# Patient Record
Sex: FEMALE | Race: WHITE | NOT HISPANIC OR LATINO | URBAN - METROPOLITAN AREA
[De-identification: names, ages, dates, MRNs, and addresses within clinical notes are randomized per-mention and may not be internally consistent; named-entity substitution may affect disease eponyms.]

---

## 2017-05-23 ENCOUNTER — OFFICE VISIT (OUTPATIENT)
Dept: OBSTETRICS AND GYNECOLOGY | Age: 67
End: 2017-05-23

## 2017-05-23 VITALS
SYSTOLIC BLOOD PRESSURE: 138 MMHG | HEIGHT: 63 IN | WEIGHT: 185.8 LBS | DIASTOLIC BLOOD PRESSURE: 64 MMHG | BODY MASS INDEX: 32.92 KG/M2

## 2017-05-23 DIAGNOSIS — E03.4 HYPOTHYROIDISM DUE TO ACQUIRED ATROPHY OF THYROID: ICD-10-CM

## 2017-05-23 DIAGNOSIS — K21.9 GASTROESOPHAGEAL REFLUX DISEASE, ESOPHAGITIS PRESENCE NOT SPECIFIED: ICD-10-CM

## 2017-05-23 DIAGNOSIS — Z00.00 ANNUAL PHYSICAL EXAM: Primary | ICD-10-CM

## 2017-05-23 PROCEDURE — 99397 PER PM REEVAL EST PAT 65+ YR: CPT | Performed by: OBSTETRICS & GYNECOLOGY

## 2017-05-23 RX ORDER — MONTELUKAST SODIUM 10 MG/1
10 TABLET ORAL
COMMUNITY

## 2017-05-23 RX ORDER — CLONAZEPAM 0.5 MG/1
0.5 TABLET ORAL
COMMUNITY
Start: 2017-05-12

## 2017-05-23 RX ORDER — LEVALBUTEROL TARTRATE 45 UG/1
1-2 AEROSOL, METERED ORAL
COMMUNITY

## 2017-05-23 RX ORDER — PANTOPRAZOLE SODIUM 40 MG/1
TABLET, DELAYED RELEASE ORAL
COMMUNITY
Start: 2017-01-29

## 2017-05-23 RX ORDER — CYANOCOBALAMIN (VITAMIN B-12) 1000 MCG
1 TABLET, EXTENDED RELEASE ORAL
COMMUNITY

## 2017-05-23 RX ORDER — ONDANSETRON 4 MG/1
4 TABLET, FILM COATED ORAL
COMMUNITY
Start: 2016-10-07 | End: 2021-06-28

## 2017-05-23 RX ORDER — LEVALBUTEROL INHALATION SOLUTION 0.31 MG/3ML
0.31 SOLUTION RESPIRATORY (INHALATION)
COMMUNITY
Start: 2015-03-04 | End: 2021-06-28 | Stop reason: SDUPTHER

## 2017-05-23 RX ORDER — DULOXETIN HYDROCHLORIDE 60 MG/1
60 CAPSULE, DELAYED RELEASE ORAL
COMMUNITY
Start: 2016-10-20 | End: 2021-06-28

## 2017-05-23 RX ORDER — FUROSEMIDE 40 MG/1
40 TABLET ORAL
COMMUNITY
Start: 2017-01-09 | End: 2018-01-09

## 2017-05-23 RX ORDER — VERAPAMIL HYDROCHLORIDE 360 MG/1
360 CAPSULE, DELAYED RELEASE PELLETS ORAL
COMMUNITY
Start: 2016-07-12 | End: 2021-06-28 | Stop reason: SDUPTHER

## 2017-05-23 RX ORDER — SUMATRIPTAN 100 MG/1
100 TABLET, FILM COATED ORAL
COMMUNITY
Start: 2016-05-17

## 2017-05-23 RX ORDER — PROMETHAZINE HYDROCHLORIDE AND CODEINE PHOSPHATE 6.25; 1 MG/5ML; MG/5ML
5 SYRUP ORAL
COMMUNITY
Start: 2017-05-12

## 2017-05-23 RX ORDER — LEVOTHYROXINE SODIUM 0.07 MG/1
100 TABLET ORAL
COMMUNITY
Start: 2017-04-21

## 2017-05-23 RX ORDER — FLUOCINONIDE 0.5 MG/G
OINTMENT TOPICAL
COMMUNITY
Start: 2014-12-29 | End: 2021-06-28

## 2017-05-23 RX ORDER — TRAMADOL HYDROCHLORIDE 50 MG/1
50 TABLET ORAL
COMMUNITY
Start: 2017-05-12 | End: 2021-06-28

## 2017-05-23 RX ORDER — BUDESONIDE AND FORMOTEROL FUMARATE DIHYDRATE 160; 4.5 UG/1; UG/1
2 AEROSOL RESPIRATORY (INHALATION)
COMMUNITY

## 2017-05-23 RX ORDER — CETIRIZINE HYDROCHLORIDE 10 MG/1
10 TABLET ORAL
COMMUNITY

## 2018-10-31 ENCOUNTER — TRANSCRIBE ORDERS (OUTPATIENT)
Dept: ADMINISTRATIVE | Facility: HOSPITAL | Age: 68
End: 2018-10-31

## 2018-10-31 DIAGNOSIS — Z12.31 VISIT FOR SCREENING MAMMOGRAM: Primary | ICD-10-CM

## 2019-01-15 ENCOUNTER — PROCEDURE VISIT (OUTPATIENT)
Dept: OBSTETRICS AND GYNECOLOGY | Age: 69
End: 2019-01-15

## 2019-01-15 ENCOUNTER — APPOINTMENT (OUTPATIENT)
Dept: WOMENS IMAGING | Facility: HOSPITAL | Age: 69
End: 2019-01-15

## 2019-01-15 DIAGNOSIS — Z12.31 VISIT FOR SCREENING MAMMOGRAM: Primary | ICD-10-CM

## 2019-01-15 PROCEDURE — 77067 SCR MAMMO BI INCL CAD: CPT | Performed by: OBSTETRICS & GYNECOLOGY

## 2019-01-15 PROCEDURE — 77067 SCR MAMMO BI INCL CAD: CPT | Performed by: RADIOLOGY

## 2019-02-27 ENCOUNTER — OFFICE VISIT (OUTPATIENT)
Dept: OBSTETRICS AND GYNECOLOGY | Age: 69
End: 2019-02-27

## 2019-02-27 VITALS
HEIGHT: 63 IN | SYSTOLIC BLOOD PRESSURE: 120 MMHG | BODY MASS INDEX: 32.07 KG/M2 | DIASTOLIC BLOOD PRESSURE: 78 MMHG | WEIGHT: 181 LBS

## 2019-02-27 DIAGNOSIS — Z00.00 ANNUAL PHYSICAL EXAM: Primary | ICD-10-CM

## 2019-02-27 DIAGNOSIS — J44.9 COPD (CHRONIC OBSTRUCTIVE PULMONARY DISEASE) WITH CHRONIC BRONCHITIS (HCC): ICD-10-CM

## 2019-02-27 DIAGNOSIS — M85.852 OSTEOPENIA OF NECK OF LEFT FEMUR: ICD-10-CM

## 2019-02-27 PROBLEM — J44.89 COPD (CHRONIC OBSTRUCTIVE PULMONARY DISEASE) WITH CHRONIC BRONCHITIS: Status: ACTIVE | Noted: 2019-02-27

## 2019-02-27 PROBLEM — G43.719 INTRACTABLE CHRONIC MIGRAINE WITHOUT AURA AND WITHOUT STATUS MIGRAINOSUS: Status: ACTIVE | Noted: 2019-02-27

## 2019-02-27 PROCEDURE — 99397 PER PM REEVAL EST PAT 65+ YR: CPT | Performed by: OBSTETRICS & GYNECOLOGY

## 2019-02-27 RX ORDER — LANOLIN ALCOHOL/MO/W.PET/CERES
CREAM (GRAM) TOPICAL
COMMUNITY

## 2019-02-27 RX ORDER — TRIAMCINOLONE ACETONIDE 55 UG/1
SPRAY, METERED NASAL
COMMUNITY
End: 2021-06-28

## 2019-02-27 RX ORDER — INDOMETHACIN 50 MG/1
CAPSULE ORAL
COMMUNITY
Start: 2018-12-31 | End: 2021-06-28

## 2019-02-27 RX ORDER — TOPIRAMATE 25 MG/1
25 TABLET ORAL
COMMUNITY
Start: 2018-06-15

## 2019-02-27 RX ORDER — PREDNISONE 20 MG/1
20 TABLET ORAL DAILY
COMMUNITY
Start: 2019-02-15 | End: 2021-06-28

## 2019-02-27 NOTE — PROGRESS NOTES
Subjective   Lilliana Robles is a 68 y.o. female is being seen today for   Chief Complaint   Patient presents with   • Gynecologic Exam     AE ([facility]). HYST no HRT.  MG 01/15/2019.  C-scope  = negative.  Family hx of colon, breast and ovarian cancer.  Lilliana has been sick since Dec 2018 w/ bronchitis.    .    History of Present Illness  Patient is here for annual check.  Right now she is having some lung problems for last couple months bronchitis not going away she is taking steroids antibiotics but still not much better.  This is all part of a chronic problem with an acute flareup.  So hopefully she will be able to get over this.  He has daughter will be getting  in a year down in [place] and wants to go down and help her little bit.  The other daughter is trying to adopt a child had on all plan but it fell through.  The only other family history is a nephew with ocular melanoma.  Her bowels and bladder work well.  The following portions of the patient's history were reviewed and updated as appropriate: allergies, current medications, past family history, past medical history, past social history, past surgical history and problem list.    Vitals:    19 1138   BP: 120/78       PAST MEDICAL HISTORY  History reviewed. No pertinent past medical history.  OB History      Para Term  AB Living    2 2 2     2    SAB TAB Ectopic Molar Multiple Live Births              2        Past Surgical History:   Procedure Laterality Date   • BREAST BIOPSY     • HYSTERECTOMY       Family History   Problem Relation Age of Onset   • No Known Problems Mother    • Lung cancer Father    • No Known Problems Sister    • Colon cancer Brother    • No Known Problems Daughter    • No Known Problems Maternal Grandmother    • No Known Problems Paternal Grandmother    • Cancer Maternal Aunt    • Ovarian cancer Paternal Aunt    • Colon cancer Paternal Uncle    • Colon cancer Paternal Uncle    • Colon cancer  Paternal Uncle    • No Known Problems Daughter    • BRCA 1/2 Neg Hx    • Breast cancer Neg Hx    • Endometrial cancer Neg Hx      Social History     Tobacco Use   Smoking Status Never Smoker   Smokeless Tobacco Never Used       Current Outpatient Medications:   •  Biotin 300 MCG tablet, Take  by mouth., Disp: , Rfl:   •  budesonide-formoterol (SYMBICORT) 160-4.5 MCG/ACT inhaler, Inhale 2 puffs., Disp: , Rfl:   •  calcium citrate-vitamin D (CITRACAL+D) 315-200 MG-UNIT per tablet, Take 1 tablet by mouth., Disp: , Rfl:   •  clonazePAM (KlonoPIN) 0.5 MG tablet, Take 0.5 mg by mouth., Disp: , Rfl:   •  Dexchlorpheniramine-Phenyleph 2-10 MG/5ML liquid, Take  by mouth., Disp: , Rfl:   •  DULoxetine (CYMBALTA) 60 MG capsule, Take 60 mg by mouth., Disp: , Rfl:   •  immune globulin, human, (FLEBOGAMMA, PRIVIGEN) 10 GM/100ML solution injection, Infuse  into a venous catheter., Disp: , Rfl:   •  immune globulin, human, 1 GM/20ML solution, Infuse  into a venous catheter., Disp: , Rfl:   •  indomethacin (INDOCIN) 50 MG capsule, TAKE ONE CAPSULE BY MOUTH TWICE A DAY AS NEEDED FOR HEADACHE, Disp: , Rfl:   •  levalbuterol (XOPENEX HFA) 45 MCG/ACT inhaler, Inhale 1-2 puffs., Disp: , Rfl:   •  levalbuterol (XOPENEX) 0.31 MG/3ML nebulizer solution, Inhale 0.31 mg., Disp: , Rfl:   •  levothyroxine (SYNTHROID, LEVOTHROID) 75 MCG tablet, TAKE 1 TABLET DAILY, Disp: , Rfl:   •  montelukast (SINGULAIR) 10 MG tablet, Take 10 mg by mouth., Disp: , Rfl:   •  ondansetron (ZOFRAN) 4 MG tablet, Take 4 mg by mouth., Disp: , Rfl:   •  predniSONE (DELTASONE) 20 MG tablet, Take 20 mg by mouth Daily., Disp: , Rfl:   •  tiotropium (SPIRIVA HANDIHALER) 18 MCG per inhalation capsule, Place 18 mcg into inhaler and inhale., Disp: , Rfl:   •  topiramate (TOPAMAX) 25 MG tablet, Take 25 mg by mouth., Disp: , Rfl:   •  traMADol (ULTRAM) 50 MG tablet, Take 50 mg by mouth., Disp: , Rfl:   •  Triamcinolone Acetonide (NASACORT) 55 MCG/ACT nasal inhaler, into the  nostril(s) as directed by provider., Disp: , Rfl:   •  verapamil PM (VERELAN PM) 360 MG 24 hr capsule, Take 360 mg by mouth., Disp: , Rfl:   •  cetirizine (zyrTEC) 10 MG tablet, Take 10 mg by mouth., Disp: , Rfl:   •  Cyanocobalamin (VITAMIN B-12 ER) 1000 MCG tablet controlled-release, Take 1 tablet by mouth., Disp: , Rfl:   •  fluocinonide (LIDEX) 0.05 % ointment, Apply  topically., Disp: , Rfl:   •  MAGNESIUM PO, Take  by mouth., Disp: , Rfl:   •  pantoprazole (PROTONIX) 40 MG EC tablet, TAKE 1 TABLET TWICE A DAY FOR A MAXIMUM OF 3 MONTHS THEN RESUME TO ONCE DAILY, Disp: , Rfl:   •  promethazine-codeine (PHENERGAN with CODEINE) 6.25-10 MG/5ML syrup, Take 5 mL by mouth., Disp: , Rfl:   •  SUMAtriptan (IMITREX) 100 MG tablet, Take 100 mg by mouth., Disp: , Rfl:     There is no immunization history on file for this patient.    Review of Systems   Constitutional: Negative for chills, fatigue, fever and unexpected weight change.   Respiratory: Positive for shortness of breath, wheezing and stridor.    Cardiovascular: Negative for chest pain.   Gastrointestinal: Negative for abdominal distention, abdominal pain, blood in stool, constipation, diarrhea and nausea.   Genitourinary: Negative for difficulty urinating, dyspareunia, dysuria, frequency, hematuria, menstrual problem, pelvic pain, urgency and vaginal discharge.   Skin: Negative for rash.       Objective   Physical Exam   Constitutional: She is oriented to person, place, and time. Vital signs are normal. She appears well-developed and well-nourished.   Neck: No thyromegaly present.   Cardiovascular: Normal rate, regular rhythm and normal heart sounds.   Pulmonary/Chest: Effort normal. Right breast exhibits no inverted nipple, no mass, no nipple discharge, no skin change and no tenderness. Left breast exhibits no inverted nipple, no mass, no nipple discharge, no skin change and no tenderness. Breasts are symmetrical. There is no breast swelling.   Abdominal: Soft.    Genitourinary: Vagina normal. No breast tenderness, discharge or bleeding. Pelvic exam was performed with patient supine. No labial fusion. There is no rash, tenderness, lesion or injury on the right labia. There is no rash, tenderness, lesion or injury on the left labia. Cervix exhibits no motion tenderness, no discharge and no friability. Right adnexum displays no mass, no tenderness and no fullness. Left adnexum displays no mass, no tenderness and no fullness.   Neurological: She is alert and oriented to person, place, and time.   Psychiatric: She has a normal mood and affect.   Vitals reviewed.        Assessment/Plan   Lilliana was seen today for gynecologic exam.    Diagnoses and all orders for this visit:    Annual physical exam    Osteopenia of neck of left femur    COPD (chronic obstructive pulmonary disease) with chronic bronchitis (CMS/[facility])      Normal exam post hysterectomy.  No Pap smear done today.  Mammogram month ago was normal.  We will repeat a bone density next year she has osteopenia and a colonoscopy is up-to-date.  She really cannot exercise because of her lung problems right now.  Come back in a year

## 2020-01-23 ENCOUNTER — OFFICE (OUTPATIENT)
Dept: URBAN - METROPOLITAN AREA CLINIC 75 | Facility: CLINIC | Age: 70
End: 2020-01-23

## 2020-01-23 VITALS
HEART RATE: 64 BPM | SYSTOLIC BLOOD PRESSURE: 114 MMHG | DIASTOLIC BLOOD PRESSURE: 74 MMHG | HEIGHT: 65 IN | WEIGHT: 210 LBS

## 2020-01-23 DIAGNOSIS — R11.0 NAUSEA: ICD-10-CM

## 2020-01-23 DIAGNOSIS — R13.10 DYSPHAGIA, UNSPECIFIED: ICD-10-CM

## 2020-01-23 DIAGNOSIS — R10.9 UNSPECIFIED ABDOMINAL PAIN: ICD-10-CM

## 2020-01-23 DIAGNOSIS — R19.4 CHANGE IN BOWEL HABIT: ICD-10-CM

## 2020-01-23 DIAGNOSIS — Z79.1 LONG TERM (CURRENT) USE OF NON-STEROIDAL ANTI-INFLAMMATORIES: ICD-10-CM

## 2020-01-23 DIAGNOSIS — Z80.9 FAMILY HISTORY OF MALIGNANT NEOPLASM, UNSPECIFIED: ICD-10-CM

## 2020-01-23 DIAGNOSIS — K62.5 HEMORRHAGE OF ANUS AND RECTUM: ICD-10-CM

## 2020-01-23 PROCEDURE — 99204 OFFICE O/P NEW MOD 45 MIN: CPT | Performed by: NURSE PRACTITIONER

## 2020-01-23 RX ORDER — HYDROCORTISONE 25 MG/G
CREAM TOPICAL
Qty: 1 | Refills: 1 | Status: COMPLETED
Start: 2020-01-23 | End: 2022-09-01

## 2020-02-17 ENCOUNTER — ON CAMPUS - OUTPATIENT (OUTPATIENT)
Dept: URBAN - METROPOLITAN AREA HOSPITAL 108 | Facility: HOSPITAL | Age: 70
End: 2020-02-17

## 2020-02-17 DIAGNOSIS — R19.7 DIARRHEA, UNSPECIFIED: ICD-10-CM

## 2020-02-17 DIAGNOSIS — D12.0 BENIGN NEOPLASM OF CECUM: ICD-10-CM

## 2020-02-17 DIAGNOSIS — K29.50 UNSPECIFIED CHRONIC GASTRITIS WITHOUT BLEEDING: ICD-10-CM

## 2020-02-17 DIAGNOSIS — R13.10 DYSPHAGIA, UNSPECIFIED: ICD-10-CM

## 2020-02-17 DIAGNOSIS — K31.89 OTHER DISEASES OF STOMACH AND DUODENUM: ICD-10-CM

## 2020-02-17 PROCEDURE — 45380 COLONOSCOPY AND BIOPSY: CPT | Performed by: INTERNAL MEDICINE

## 2020-02-17 PROCEDURE — 43239 EGD BIOPSY SINGLE/MULTIPLE: CPT | Performed by: INTERNAL MEDICINE

## 2020-02-17 PROCEDURE — 43450 DILATE ESOPHAGUS 1/MULT PASS: CPT | Performed by: INTERNAL MEDICINE

## 2020-06-22 ENCOUNTER — PROCEDURE VISIT (OUTPATIENT)
Dept: OBSTETRICS AND GYNECOLOGY | Age: 70
End: 2020-06-22

## 2020-06-22 ENCOUNTER — APPOINTMENT (OUTPATIENT)
Dept: WOMENS IMAGING | Facility: HOSPITAL | Age: 70
End: 2020-06-22

## 2020-06-22 ENCOUNTER — OFFICE VISIT (OUTPATIENT)
Dept: OBSTETRICS AND GYNECOLOGY | Age: 70
End: 2020-06-22

## 2020-06-22 VITALS
HEIGHT: 63 IN | WEIGHT: 184 LBS | BODY MASS INDEX: 32.6 KG/M2 | SYSTOLIC BLOOD PRESSURE: 142 MMHG | DIASTOLIC BLOOD PRESSURE: 84 MMHG

## 2020-06-22 DIAGNOSIS — M85.852 OSTEOPENIA OF LEFT HIP: ICD-10-CM

## 2020-06-22 DIAGNOSIS — Z12.31 VISIT FOR SCREENING MAMMOGRAM: Primary | ICD-10-CM

## 2020-06-22 DIAGNOSIS — Z78.0 POST-MENOPAUSAL: ICD-10-CM

## 2020-06-22 DIAGNOSIS — Z00.00 ANNUAL PHYSICAL EXAM: Primary | ICD-10-CM

## 2020-06-22 PROBLEM — M16.11 PRIMARY OSTEOARTHRITIS OF RIGHT HIP: Status: ACTIVE | Noted: 2020-01-17

## 2020-06-22 PROCEDURE — 77067 SCR MAMMO BI INCL CAD: CPT | Performed by: OBSTETRICS & GYNECOLOGY

## 2020-06-22 PROCEDURE — 99397 PER PM REEVAL EST PAT 65+ YR: CPT | Performed by: OBSTETRICS & GYNECOLOGY

## 2020-06-22 PROCEDURE — 77067 SCR MAMMO BI INCL CAD: CPT | Performed by: RADIOLOGY

## 2020-06-22 PROCEDURE — 77080 DXA BONE DENSITY AXIAL: CPT | Performed by: OBSTETRICS & GYNECOLOGY

## 2020-06-22 NOTE — PROGRESS NOTES
Subjective   Lilliana Robles is a 69 y.o. female is being seen today for   Chief Complaint   Patient presents with   • Gynecologic Exam     pap HYST, MG today, DEXA today, C-scope    .    History of Present Illness  Patient is here for annual exam.  Her lungs are stable as a have been up and down on some medication steroids but overall pretty much about the same.  Her grand sort her daughter who is post be  this year in [place] called off of which is probably a good thing.  Although does doing well with 2 grandkids.  Nothing new in the family, bowels and bladder work well.  Nothing else during the past year  The following portions of the patient's history were reviewed and updated as appropriate: allergies, current medications, past family history, past medical history, past social history, past surgical history and problem list.    Vitals:    20 1459   BP: 142/84       PAST MEDICAL HISTORY  History reviewed. No pertinent past medical history.  OB History        2    Para   2    Term   2            AB        Living   2       SAB        TAB        Ectopic        Molar        Multiple        Live Births   2              Past Surgical History:   Procedure Laterality Date   • BREAST BIOPSY     • COLONOSCOPY  2020   • HYSTERECTOMY       Family History   Problem Relation Age of Onset   • No Known Problems Mother    • Lung cancer Father    • No Known Problems Sister    • Colon cancer Brother    • No Known Problems Daughter    • No Known Problems Maternal Grandmother    • No Known Problems Paternal Grandmother    • Cancer Maternal Aunt    • Ovarian cancer Paternal Aunt    • Colon cancer Paternal Uncle    • Colon cancer Paternal Uncle    • Colon cancer Paternal Uncle    • No Known Problems Daughter    • BRCA 1/2 Neg Hx    • Breast cancer Neg Hx    • Endometrial cancer Neg Hx      Social History     Tobacco Use   Smoking Status Never Smoker   Smokeless Tobacco Never Used       Current Outpatient  Medications:   •  Biotin 300 MCG tablet, Take  by mouth., Disp: , Rfl:   •  budesonide-formoterol (SYMBICORT) 160-4.5 MCG/ACT inhaler, Inhale 2 puffs., Disp: , Rfl:   •  cetirizine (zyrTEC) 10 MG tablet, Take 10 mg by mouth., Disp: , Rfl:   •  clonazePAM (KlonoPIN) 0.5 MG tablet, Take 0.5 mg by mouth., Disp: , Rfl:   •  DULoxetine (CYMBALTA) 60 MG capsule, Take 60 mg by mouth., Disp: , Rfl:   •  fluocinonide (LIDEX) 0.05 % ointment, Apply  topically., Disp: , Rfl:   •  immune globulin, human, (FLEBOGAMMA, PRIVIGEN) 10 GM/100ML solution injection, Infuse  into a venous catheter., Disp: , Rfl:   •  immune globulin, human, 1 GM/20ML solution, Infuse  into a venous catheter., Disp: , Rfl:   •  levalbuterol (XOPENEX HFA) 45 MCG/ACT inhaler, Inhale 1-2 puffs., Disp: , Rfl:   •  levalbuterol (XOPENEX) 0.31 MG/3ML nebulizer solution, Inhale 0.31 mg., Disp: , Rfl:   •  levothyroxine (SYNTHROID, LEVOTHROID) 75 MCG tablet, TAKE 1 TABLET DAILY, Disp: , Rfl:   •  montelukast (SINGULAIR) 10 MG tablet, Take 10 mg by mouth., Disp: , Rfl:   •  ondansetron (ZOFRAN) 4 MG tablet, Take 4 mg by mouth., Disp: , Rfl:   •  pantoprazole (PROTONIX) 40 MG EC tablet, TAKE 1 TABLET TWICE A DAY FOR A MAXIMUM OF 3 MONTHS THEN RESUME TO ONCE DAILY, Disp: , Rfl:   •  predniSONE (DELTASONE) 20 MG tablet, Take 20 mg by mouth Daily., Disp: , Rfl:   •  tiotropium (SPIRIVA HANDIHALER) 18 MCG per inhalation capsule, Place 18 mcg into inhaler and inhale., Disp: , Rfl:   •  topiramate (TOPAMAX) 25 MG tablet, Take 25 mg by mouth., Disp: , Rfl:   •  traMADol (ULTRAM) 50 MG tablet, Take 50 mg by mouth., Disp: , Rfl:   •  Triamcinolone Acetonide (NASACORT) 55 MCG/ACT nasal inhaler, into the nostril(s) as directed by provider., Disp: , Rfl:   •  verapamil PM (VERELAN PM) 360 MG 24 hr capsule, Take 360 mg by mouth., Disp: , Rfl:   •  calcium citrate-vitamin D (CITRACAL+D) 315-200 MG-UNIT per tablet, Take 1 tablet by mouth., Disp: , Rfl:   •  Cyanocobalamin  (VITAMIN B-12 ER) 1000 MCG tablet controlled-release, Take 1 tablet by mouth., Disp: , Rfl:   •  Dexchlorpheniramine-Phenyleph 2-10 MG/5ML liquid, Take  by mouth., Disp: , Rfl:   •  indomethacin (INDOCIN) 50 MG capsule, TAKE ONE CAPSULE BY MOUTH TWICE A DAY AS NEEDED FOR HEADACHE, Disp: , Rfl:   •  MAGNESIUM PO, Take  by mouth., Disp: , Rfl:   •  promethazine-codeine (PHENERGAN with CODEINE) 6.25-10 MG/5ML syrup, Take 5 mL by mouth., Disp: , Rfl:   •  SUMAtriptan (IMITREX) 100 MG tablet, Take 100 mg by mouth., Disp: , Rfl:     There is no immunization history on file for this patient.    Review of Systems   Constitutional: Negative for chills, fatigue, fever and unexpected weight change.   Respiratory: Positive for shortness of breath. Negative for wheezing.    Cardiovascular: Negative for chest pain.   Gastrointestinal: Negative for abdominal distention, abdominal pain, blood in stool, constipation, diarrhea and nausea.   Genitourinary: Negative for difficulty urinating, dyspareunia, dysuria, frequency, hematuria, menstrual problem, pelvic pain, urgency and vaginal discharge.   Skin: Negative for rash.       Objective   Physical Exam   Constitutional: She is oriented to person, place, and time. Vital signs are normal. She appears well-developed and well-nourished.   Neck: No thyromegaly present.   Cardiovascular: Normal rate, regular rhythm and normal heart sounds.   Pulmonary/Chest: Effort normal. Right breast exhibits no inverted nipple, no mass, no nipple discharge, no skin change and no tenderness. Left breast exhibits no inverted nipple, no mass, no nipple discharge, no skin change and no tenderness. No breast swelling, tenderness, discharge or bleeding. Breasts are symmetrical.   Abdominal: Soft.   Genitourinary: Vagina normal. No breast swelling, tenderness, discharge or bleeding. Pelvic exam was performed with patient supine. No labial fusion. There is no rash, tenderness, lesion or injury on the right  labia. There is no rash, tenderness, lesion or injury on the left labia. Cervix exhibits no motion tenderness, no discharge and no friability. Right adnexum displays no mass, no tenderness and no fullness. Left adnexum displays no mass, no tenderness and no fullness.   Neurological: She is alert and oriented to person, place, and time.   Psychiatric: She has a normal mood and affect.   Vitals reviewed.        Assessment/Plan   Lilliana was seen today for gynecologic exam.    Diagnoses and all orders for this visit:    Annual physical exam    Osteopenia of left hip      Normal exam today.  No Pap post hysterectomy.  Mammogram was done today.  Bone density also done today with normal spine osteopenia in the hip that was discussed.  Colonoscopy just had some polyps but she is on a 5-year plan.  Can really exercise with the lung problems back in a year

## 2020-09-29 ENCOUNTER — OFFICE (OUTPATIENT)
Dept: URBAN - METROPOLITAN AREA CLINIC 75 | Facility: CLINIC | Age: 70
End: 2020-09-29
Payer: MEDICARE

## 2020-09-29 VITALS — WEIGHT: 190 LBS | HEIGHT: 65 IN | RESPIRATION RATE: 14 BRPM | TEMPERATURE: 97.5 F

## 2020-09-29 DIAGNOSIS — R19.4 CHANGE IN BOWEL HABIT: ICD-10-CM

## 2020-09-29 DIAGNOSIS — Z80.9 FAMILY HISTORY OF MALIGNANT NEOPLASM, UNSPECIFIED: ICD-10-CM

## 2020-09-29 DIAGNOSIS — R10.9 UNSPECIFIED ABDOMINAL PAIN: ICD-10-CM

## 2020-09-29 DIAGNOSIS — R13.10 DYSPHAGIA, UNSPECIFIED: ICD-10-CM

## 2020-09-29 DIAGNOSIS — K62.5 HEMORRHAGE OF ANUS AND RECTUM: ICD-10-CM

## 2020-09-29 DIAGNOSIS — Z79.1 LONG TERM (CURRENT) USE OF NON-STEROIDAL ANTI-INFLAMMATORIES: ICD-10-CM

## 2020-09-29 DIAGNOSIS — R11.0 NAUSEA: ICD-10-CM

## 2020-09-29 PROCEDURE — 99214 OFFICE O/P EST MOD 30 MIN: CPT | Performed by: NURSE PRACTITIONER

## 2020-09-29 RX ORDER — PANTOPRAZOLE 40 MG/1
TABLET, DELAYED RELEASE ORAL
Qty: 60 | Refills: 6 | Status: ACTIVE

## 2021-01-21 ENCOUNTER — OFFICE (OUTPATIENT)
Dept: URBAN - METROPOLITAN AREA CLINIC 75 | Facility: CLINIC | Age: 71
End: 2021-01-21

## 2021-01-21 VITALS — HEIGHT: 62 IN | OXYGEN SATURATION: 100 % | WEIGHT: 189 LBS | TEMPERATURE: 96.1 F | HEART RATE: 79 BPM

## 2021-01-21 DIAGNOSIS — R11.0 NAUSEA: ICD-10-CM

## 2021-01-21 DIAGNOSIS — Z79.1 LONG TERM (CURRENT) USE OF NON-STEROIDAL ANTI-INFLAMMATORIES: ICD-10-CM

## 2021-01-21 DIAGNOSIS — Z80.9 FAMILY HISTORY OF MALIGNANT NEOPLASM, UNSPECIFIED: ICD-10-CM

## 2021-01-21 DIAGNOSIS — R13.10 DYSPHAGIA, UNSPECIFIED: ICD-10-CM

## 2021-01-21 DIAGNOSIS — R10.9 UNSPECIFIED ABDOMINAL PAIN: ICD-10-CM

## 2021-01-21 DIAGNOSIS — R19.4 CHANGE IN BOWEL HABIT: ICD-10-CM

## 2021-01-21 PROCEDURE — 99214 OFFICE O/P EST MOD 30 MIN: CPT | Performed by: NURSE PRACTITIONER

## 2021-01-21 RX ORDER — DICYCLOMINE HYDROCHLORIDE 10 MG/1
CAPSULE ORAL
Qty: 90 | Refills: 10 | Status: ACTIVE
Start: 2021-01-21

## 2021-06-28 ENCOUNTER — OFFICE VISIT (OUTPATIENT)
Dept: OBSTETRICS AND GYNECOLOGY | Age: 71
End: 2021-06-28

## 2021-06-28 VITALS
WEIGHT: 182.6 LBS | BODY MASS INDEX: 33.6 KG/M2 | SYSTOLIC BLOOD PRESSURE: 130 MMHG | HEIGHT: 62 IN | DIASTOLIC BLOOD PRESSURE: 80 MMHG

## 2021-06-28 DIAGNOSIS — Z01.419 ENCOUNTER FOR GYNECOLOGICAL EXAMINATION: Primary | ICD-10-CM

## 2021-06-28 DIAGNOSIS — Z12.31 ENCOUNTER FOR SCREENING MAMMOGRAM FOR MALIGNANT NEOPLASM OF BREAST: ICD-10-CM

## 2021-06-28 PROCEDURE — G0101 CA SCREEN;PELVIC/BREAST EXAM: HCPCS | Performed by: OBSTETRICS & GYNECOLOGY

## 2021-06-28 RX ORDER — BUTALBITAL, ACETAMINOPHEN AND CAFFEINE 50; 325; 40 MG/1; MG/1; MG/1
TABLET ORAL
COMMUNITY
Start: 2021-04-09 | End: 2022-06-30

## 2021-06-28 RX ORDER — DICYCLOMINE HYDROCHLORIDE 10 MG/1
CAPSULE ORAL
COMMUNITY
Start: 2021-03-28

## 2021-06-28 RX ORDER — CITALOPRAM 10 MG/1
TABLET ORAL
COMMUNITY
Start: 2021-06-23

## 2021-06-28 RX ORDER — MELATONIN
5000 DAILY
COMMUNITY
Start: 2021-03-17 | End: 2022-03-17

## 2021-06-28 RX ORDER — LEVOTHYROXINE SODIUM 0.1 MG/1
TABLET ORAL
COMMUNITY
Start: 2021-06-25 | End: 2021-06-28 | Stop reason: SDUPTHER

## 2021-06-28 NOTE — PROGRESS NOTES
".  Subjective     Chief Complaint   Patient presents with   • Gynecologic Exam     NP  AE   LAE 20 (CAROL), MG 20       History of Present Illness    Lilliana Robles is a 70 y.o.  who presents for annual exam. She is a pt of Dr. Mendoza and is new to this MD  She denies any vaginal bleeding or pelvic pain or any gyn issues  She receives infusion therapy for an immune deficiency      Obstetric History:  OB History        2    Para   2    Term   2            AB        Living   2       SAB        TAB        Ectopic        Molar        Multiple        Live Births   2               Menstrual History:     No LMP recorded (lmp unknown). Patient has had a hysterectomy.         Current contraception: status post hysterectomy  History of abnormal Pap smear: no  Received Gardasil immunization: no  Perform regular self breast exam: yes - reg  Family history of uterine or ovarian cancer: yes - pat aunt  Family History of colon cancer: yes - pat uncle  Family history of breast cancer: no    Mammogram: ordered.  Colonoscopy: up to date, had  at [facility], repeat in 5 years per surgeon's note  DEXA: up to date, osteopenia     Exercise: moderately active  Calcium/Vitamin D: adequate intake    The following portions of the patient's history were reviewed and updated as appropriate: allergies, current medications, past family history, past medical history, past social history, past surgical history and problem list.    Review of Systems    Review of Systems   Constitutional: Negative for fatigue.   Respiratory: Negative for shortness of breath.    Gastrointestinal: Negative for abdominal pain.   Genitourinary: Negative for dysuria. Negative for vaginal bleeding or pelvic pain  Neurological: Negative for headaches.   Psychiatric/Behavioral: Negative for dysphoric mood.         Objective   Physical Exam    /80   Ht 157.5 cm (62\")   Wt 82.8 kg (182 lb 9.6 oz)   LMP  (LMP Unknown)   " Breastfeeding No   BMI 33.40 kg/m²   General:   alert and no distress   Heart: regular rate and rhythm   Lungs: clear to auscultation bilaterally   Breast: Inspection with healed scars right chest from port placements/removal. Port palpated in right upper chest wall. Breasts are without masses, retractions, nipple discharge or axillary adenopathy.   Neck: Supple, no thyromegaly   Abdomen: soft, non-tender. Bowel sounds normal. No masses,  no organomegaly   Pelvis: External genitalia: normal general appearance  Urinary system: urethral meatus normal  Vaginal: atrophic without focal lesions  Cervix: removed surgically  Adnexa: no masses or tenderness  Uterus: removed surgically   Extremities: Extremities normal, atraumatic, no cyanosis or edema   Neurologic: Alert and oriented   Psychiatric: Normal affect, judgement, and mood     Assessment/Plan   Diagnoses and all orders for this visit:    1. Encounter for gynecological examination (Primary)        All questions answered.  Breast self exam technique reviewed and patient encouraged to perform self-exam monthly.  Discussed healthy lifestyle modifications.  Recommended 30 minutes of aerobic exercise five times per week.  Discussed calcium needs to prevent osteoporosis.  Pap deferred as she has hyst and she agrees  Recommend she book mammogram and call if she does not receive results within 2 weeks of imaging. Pt note she prefers to schedule at [facility] so order placed.    Offered genetic screening due to family hx of ovarian cancer. Pt wants to consider. Provided handouts on [employer] and My [employer] for pt review

## 2021-07-30 ENCOUNTER — APPOINTMENT (OUTPATIENT)
Dept: MAMMOGRAPHY | Facility: HOSPITAL | Age: 71
End: 2021-07-30

## 2021-08-04 ENCOUNTER — OFFICE (OUTPATIENT)
Dept: URBAN - METROPOLITAN AREA CLINIC 75 | Facility: CLINIC | Age: 71
End: 2021-08-04

## 2021-08-04 VITALS
DIASTOLIC BLOOD PRESSURE: 78 MMHG | WEIGHT: 179.2 LBS | RESPIRATION RATE: 18 BRPM | OXYGEN SATURATION: 96 % | SYSTOLIC BLOOD PRESSURE: 136 MMHG | HEART RATE: 74 BPM | HEIGHT: 62 IN

## 2021-08-04 DIAGNOSIS — K29.70 GASTRITIS, UNSPECIFIED, WITHOUT BLEEDING: ICD-10-CM

## 2021-08-04 DIAGNOSIS — R11.0 NAUSEA: ICD-10-CM

## 2021-08-04 DIAGNOSIS — K21.9 GASTRO-ESOPHAGEAL REFLUX DISEASE WITHOUT ESOPHAGITIS: ICD-10-CM

## 2021-08-04 PROCEDURE — 99214 OFFICE O/P EST MOD 30 MIN: CPT | Performed by: NURSE PRACTITIONER

## 2021-10-21 ENCOUNTER — OFFICE (OUTPATIENT)
Dept: URBAN - METROPOLITAN AREA CLINIC 75 | Facility: CLINIC | Age: 71
End: 2021-10-21
Payer: MEDICARE

## 2021-10-21 VITALS
DIASTOLIC BLOOD PRESSURE: 78 MMHG | HEART RATE: 68 BPM | WEIGHT: 182 LBS | RESPIRATION RATE: 16 BRPM | SYSTOLIC BLOOD PRESSURE: 134 MMHG | OXYGEN SATURATION: 96 % | HEIGHT: 62 IN

## 2021-10-21 DIAGNOSIS — R11.0 NAUSEA: ICD-10-CM

## 2021-10-21 DIAGNOSIS — K21.9 GASTRO-ESOPHAGEAL REFLUX DISEASE WITHOUT ESOPHAGITIS: ICD-10-CM

## 2021-10-21 DIAGNOSIS — R10.9 UNSPECIFIED ABDOMINAL PAIN: ICD-10-CM

## 2021-10-21 PROCEDURE — 99214 OFFICE O/P EST MOD 30 MIN: CPT | Performed by: NURSE PRACTITIONER

## 2021-10-21 RX ORDER — DICYCLOMINE HYDROCHLORIDE 10 MG/1
30 CAPSULE ORAL
Qty: 120 | Refills: 8 | Status: COMPLETED
Start: 2021-10-21 | End: 2022-12-06

## 2021-10-21 RX ORDER — PANTOPRAZOLE 40 MG/1
TABLET, DELAYED RELEASE ORAL
Qty: 60 | Refills: 6 | Status: ACTIVE

## 2021-12-08 ENCOUNTER — TELEPHONE (OUTPATIENT)
Dept: OBSTETRICS AND GYNECOLOGY | Age: 71
End: 2021-12-08

## 2021-12-09 ENCOUNTER — TELEPHONE (OUTPATIENT)
Dept: OBSTETRICS AND GYNECOLOGY | Age: 71
End: 2021-12-09

## 2021-12-09 NOTE — TELEPHONE ENCOUNTER
Called pt regarding overdue mammogram. She was a no show for her appt. Recommended she r/s asap. Pt notes understanding.

## 2022-07-01 ENCOUNTER — PROCEDURE VISIT (OUTPATIENT)
Dept: OBSTETRICS AND GYNECOLOGY | Age: 72
End: 2022-07-01

## 2022-07-01 ENCOUNTER — APPOINTMENT (OUTPATIENT)
Dept: WOMENS IMAGING | Facility: HOSPITAL | Age: 72
End: 2022-07-01

## 2022-07-01 ENCOUNTER — OFFICE VISIT (OUTPATIENT)
Dept: OBSTETRICS AND GYNECOLOGY | Age: 72
End: 2022-07-01

## 2022-07-01 VITALS
WEIGHT: 195 LBS | DIASTOLIC BLOOD PRESSURE: 74 MMHG | SYSTOLIC BLOOD PRESSURE: 140 MMHG | BODY MASS INDEX: 35.88 KG/M2 | HEIGHT: 62 IN

## 2022-07-01 DIAGNOSIS — Z12.31 ENCOUNTER FOR SCREENING MAMMOGRAM FOR MALIGNANT NEOPLASM OF BREAST: ICD-10-CM

## 2022-07-01 DIAGNOSIS — Z12.31 VISIT FOR SCREENING MAMMOGRAM: Primary | ICD-10-CM

## 2022-07-01 DIAGNOSIS — Z01.419 ENCOUNTER FOR GYNECOLOGICAL EXAMINATION: Primary | ICD-10-CM

## 2022-07-01 PROBLEM — E66.9 CLASS 1 OBESITY IN ADULT: Status: ACTIVE | Noted: 2021-12-31

## 2022-07-01 PROBLEM — E66.811 CLASS 1 OBESITY IN ADULT: Status: ACTIVE | Noted: 2021-12-31

## 2022-07-01 PROBLEM — Z86.69 HISTORY OF MIGRAINE: Status: ACTIVE | Noted: 2021-12-31

## 2022-07-01 PROBLEM — I82.90 ACUTE DEEP VEIN THROMBOSIS (DVT) OF NON-EXTREMITY VEIN: Status: ACTIVE | Noted: 2021-12-31

## 2022-07-01 PROBLEM — J45.909 ASTHMA: Status: ACTIVE | Noted: 2021-12-31

## 2022-07-01 PROCEDURE — 99397 PER PM REEVAL EST PAT 65+ YR: CPT | Performed by: OBSTETRICS & GYNECOLOGY

## 2022-07-01 PROCEDURE — 77067 SCR MAMMO BI INCL CAD: CPT | Performed by: OBSTETRICS & GYNECOLOGY

## 2022-07-01 PROCEDURE — 77063 BREAST TOMOSYNTHESIS BI: CPT | Performed by: RADIOLOGY

## 2022-07-01 PROCEDURE — 77067 SCR MAMMO BI INCL CAD: CPT | Performed by: RADIOLOGY

## 2022-07-01 PROCEDURE — 77063 BREAST TOMOSYNTHESIS BI: CPT | Performed by: OBSTETRICS & GYNECOLOGY

## 2022-07-01 RX ORDER — DEXTROSE MONOHYDRATE 50 MG/ML
INJECTION, SOLUTION INTRAVENOUS
COMMUNITY
Start: 2022-04-11

## 2022-07-01 RX ORDER — HUMAN IMMUNOGLOBULIN G 0.2 G/ML
LIQUID SUBCUTANEOUS
COMMUNITY
Start: 2022-05-20

## 2022-07-01 RX ORDER — NAPROXEN 500 MG/1
500 TABLET ORAL
COMMUNITY
Start: 2022-05-24

## 2022-07-01 RX ORDER — DUPILUMAB 300 MG/2ML
INJECTION, SOLUTION SUBCUTANEOUS
COMMUNITY
Start: 2022-06-09

## 2022-07-01 RX ORDER — MIRTAZAPINE 7.5 MG/1
7.5 TABLET, FILM COATED ORAL NIGHTLY
COMMUNITY
Start: 2022-06-22

## 2022-07-01 RX ORDER — LACTOBACILLUS ACIDOPH-L.BULGARICUS 1 MILLION CELL CHEWABLE TABLET 1MM CELL
1 TABLET,CHEWABLE ORAL 3 TIMES DAILY
COMMUNITY
Start: 2022-01-03

## 2022-07-01 RX ORDER — TIOTROPIUM BROMIDE INHALATION SPRAY 3.12 UG/1
SPRAY, METERED RESPIRATORY (INHALATION)
COMMUNITY
Start: 2022-04-17

## 2022-09-01 ENCOUNTER — OFFICE (OUTPATIENT)
Dept: URBAN - METROPOLITAN AREA CLINIC 75 | Facility: CLINIC | Age: 72
End: 2022-09-01

## 2022-09-01 VITALS
OXYGEN SATURATION: 95 % | DIASTOLIC BLOOD PRESSURE: 80 MMHG | WEIGHT: 184 LBS | HEART RATE: 90 BPM | HEIGHT: 62 IN | SYSTOLIC BLOOD PRESSURE: 120 MMHG

## 2022-09-01 DIAGNOSIS — K21.9 GASTRO-ESOPHAGEAL REFLUX DISEASE WITHOUT ESOPHAGITIS: ICD-10-CM

## 2022-09-01 DIAGNOSIS — R19.4 CHANGE IN BOWEL HABIT: ICD-10-CM

## 2022-09-01 DIAGNOSIS — R10.9 UNSPECIFIED ABDOMINAL PAIN: ICD-10-CM

## 2022-09-01 DIAGNOSIS — R11.0 NAUSEA: ICD-10-CM

## 2022-09-01 DIAGNOSIS — Z79.1 LONG TERM (CURRENT) USE OF NON-STEROIDAL ANTI-INFLAMMATORIES: ICD-10-CM

## 2022-09-01 DIAGNOSIS — Z80.0 FAMILY HISTORY OF MALIGNANT NEOPLASM OF DIGESTIVE ORGANS: ICD-10-CM

## 2022-09-01 DIAGNOSIS — D83.9 COMMON VARIABLE IMMUNODEFICIENCY, UNSPECIFIED: ICD-10-CM

## 2022-09-01 PROCEDURE — 99214 OFFICE O/P EST MOD 30 MIN: CPT | Performed by: NURSE PRACTITIONER

## 2022-09-01 RX ORDER — DICYCLOMINE HYDROCHLORIDE 20 MG/1
TABLET ORAL
Qty: 270 | Refills: 3 | Status: ACTIVE
Start: 2022-09-01

## 2022-12-06 ENCOUNTER — OFFICE (OUTPATIENT)
Dept: URBAN - METROPOLITAN AREA CLINIC 76 | Facility: CLINIC | Age: 72
End: 2022-12-06
Payer: MEDICARE

## 2022-12-06 VITALS
HEART RATE: 78 BPM | WEIGHT: 185 LBS | DIASTOLIC BLOOD PRESSURE: 78 MMHG | SYSTOLIC BLOOD PRESSURE: 120 MMHG | OXYGEN SATURATION: 95 % | HEIGHT: 62 IN

## 2022-12-06 DIAGNOSIS — R11.0 NAUSEA: ICD-10-CM

## 2022-12-06 DIAGNOSIS — K58.0 IRRITABLE BOWEL SYNDROME WITH DIARRHEA: ICD-10-CM

## 2022-12-06 DIAGNOSIS — D83.9 COMMON VARIABLE IMMUNODEFICIENCY, UNSPECIFIED: ICD-10-CM

## 2022-12-06 DIAGNOSIS — R19.4 CHANGE IN BOWEL HABIT: ICD-10-CM

## 2022-12-06 DIAGNOSIS — K21.9 GASTRO-ESOPHAGEAL REFLUX DISEASE WITHOUT ESOPHAGITIS: ICD-10-CM

## 2022-12-06 PROCEDURE — 99214 OFFICE O/P EST MOD 30 MIN: CPT | Performed by: NURSE PRACTITIONER

## 2022-12-06 RX ORDER — COLESTIPOL HYDROCHLORIDE 1 G/1
TABLET, FILM COATED ORAL
Qty: 270 | Refills: 4 | Status: COMPLETED
Start: 2022-12-06 | End: 2024-06-06

## 2023-03-22 ENCOUNTER — OFFICE (OUTPATIENT)
Dept: URBAN - METROPOLITAN AREA CLINIC 76 | Facility: CLINIC | Age: 73
End: 2023-03-22

## 2023-03-22 VITALS
HEIGHT: 62 IN | SYSTOLIC BLOOD PRESSURE: 122 MMHG | DIASTOLIC BLOOD PRESSURE: 76 MMHG | WEIGHT: 185 LBS | HEART RATE: 80 BPM

## 2023-03-22 DIAGNOSIS — R10.9 UNSPECIFIED ABDOMINAL PAIN: ICD-10-CM

## 2023-03-22 DIAGNOSIS — K21.9 GASTRO-ESOPHAGEAL REFLUX DISEASE WITHOUT ESOPHAGITIS: ICD-10-CM

## 2023-03-22 DIAGNOSIS — K58.0 IRRITABLE BOWEL SYNDROME WITH DIARRHEA: ICD-10-CM

## 2023-03-22 DIAGNOSIS — D83.9 COMMON VARIABLE IMMUNODEFICIENCY, UNSPECIFIED: ICD-10-CM

## 2023-03-22 DIAGNOSIS — R19.4 CHANGE IN BOWEL HABIT: ICD-10-CM

## 2023-03-22 PROCEDURE — 99214 OFFICE O/P EST MOD 30 MIN: CPT | Performed by: INTERNAL MEDICINE

## 2023-03-22 RX ORDER — RIFAXIMIN 550 MG/1
1650 TABLET ORAL
Qty: 42 | Refills: 2 | Status: COMPLETED
Start: 2023-03-22 | End: 2024-02-08

## 2023-03-22 RX ORDER — RIFAXIMIN 550 MG/1
1650 TABLET ORAL
Qty: 42 | Refills: 2 | Status: COMPLETED
Start: 2023-03-22 | End: 2023-10-05

## 2023-06-26 ENCOUNTER — OFFICE (OUTPATIENT)
Dept: URBAN - METROPOLITAN AREA CLINIC 76 | Facility: CLINIC | Age: 73
End: 2023-06-26
Payer: MEDICARE

## 2023-06-26 VITALS
DIASTOLIC BLOOD PRESSURE: 86 MMHG | HEART RATE: 86 BPM | OXYGEN SATURATION: 97 % | HEIGHT: 62 IN | SYSTOLIC BLOOD PRESSURE: 127 MMHG | WEIGHT: 142 LBS

## 2023-06-26 DIAGNOSIS — Z80.9 FAMILY HISTORY OF MALIGNANT NEOPLASM, UNSPECIFIED: ICD-10-CM

## 2023-06-26 DIAGNOSIS — R19.4 CHANGE IN BOWEL HABIT: ICD-10-CM

## 2023-06-26 DIAGNOSIS — D83.9 COMMON VARIABLE IMMUNODEFICIENCY, UNSPECIFIED: ICD-10-CM

## 2023-06-26 DIAGNOSIS — K21.9 GASTRO-ESOPHAGEAL REFLUX DISEASE WITHOUT ESOPHAGITIS: ICD-10-CM

## 2023-06-26 DIAGNOSIS — K58.0 IRRITABLE BOWEL SYNDROME WITH DIARRHEA: ICD-10-CM

## 2023-06-26 PROCEDURE — 99214 OFFICE O/P EST MOD 30 MIN: CPT | Performed by: NURSE PRACTITIONER

## 2023-06-26 RX ORDER — PANTOPRAZOLE 40 MG/1
TABLET, DELAYED RELEASE ORAL
Qty: 60 | Refills: 6 | Status: ACTIVE

## 2023-06-26 RX ORDER — DICYCLOMINE HYDROCHLORIDE 20 MG/1
TABLET ORAL
Qty: 270 | Refills: 3 | Status: ACTIVE
Start: 2022-09-01

## 2023-07-10 PROBLEM — D68.51 FACTOR V LEIDEN: Status: ACTIVE | Noted: 2023-07-10

## 2023-07-10 PROBLEM — D68.61 ANTIPHOSPHOLIPID SYNDROME: Status: ACTIVE | Noted: 2023-07-10

## 2023-10-05 ENCOUNTER — OFFICE (OUTPATIENT)
Dept: URBAN - METROPOLITAN AREA CLINIC 76 | Facility: CLINIC | Age: 73
End: 2023-10-05
Payer: MEDICARE

## 2023-10-05 VITALS
OXYGEN SATURATION: 95 % | DIASTOLIC BLOOD PRESSURE: 74 MMHG | WEIGHT: 185 LBS | HEIGHT: 62 IN | HEART RATE: 82 BPM | SYSTOLIC BLOOD PRESSURE: 112 MMHG

## 2023-10-05 DIAGNOSIS — K21.9 GASTRO-ESOPHAGEAL REFLUX DISEASE WITHOUT ESOPHAGITIS: ICD-10-CM

## 2023-10-05 DIAGNOSIS — Z80.9 FAMILY HISTORY OF MALIGNANT NEOPLASM, UNSPECIFIED: ICD-10-CM

## 2023-10-05 DIAGNOSIS — K58.0 IRRITABLE BOWEL SYNDROME WITH DIARRHEA: ICD-10-CM

## 2023-10-05 PROCEDURE — 99214 OFFICE O/P EST MOD 30 MIN: CPT | Performed by: NURSE PRACTITIONER

## 2023-10-05 RX ORDER — DICYCLOMINE HYDROCHLORIDE 20 MG/1
TABLET ORAL
Qty: 270 | Refills: 3 | Status: ACTIVE
Start: 2022-09-01

## 2023-10-11 ENCOUNTER — OFFICE VISIT (OUTPATIENT)
Dept: OBSTETRICS AND GYNECOLOGY | Age: 73
End: 2023-10-11
Payer: MEDICARE

## 2023-10-11 ENCOUNTER — TRANSCRIBE ORDERS (OUTPATIENT)
Dept: ADMINISTRATIVE | Facility: HOSPITAL | Age: 73
End: 2023-10-11
Payer: MEDICARE

## 2023-10-11 ENCOUNTER — LAB (OUTPATIENT)
Facility: HOSPITAL | Age: 73
End: 2023-10-11
Payer: MEDICARE

## 2023-10-11 VITALS
BODY MASS INDEX: 34.63 KG/M2 | WEIGHT: 188.2 LBS | DIASTOLIC BLOOD PRESSURE: 86 MMHG | SYSTOLIC BLOOD PRESSURE: 128 MMHG | HEIGHT: 62 IN

## 2023-10-11 DIAGNOSIS — J30.9 SPASMODIC RHINORRHEA: ICD-10-CM

## 2023-10-11 DIAGNOSIS — J30.1 ALLERGIC RHINITIS DUE TO POLLEN, UNSPECIFIED SEASONALITY: ICD-10-CM

## 2023-10-11 DIAGNOSIS — B37.9 INFECTION DUE TO CANDIDA SPECIES: ICD-10-CM

## 2023-10-11 DIAGNOSIS — Z12.31 ENCOUNTER FOR SCREENING MAMMOGRAM FOR MALIGNANT NEOPLASM OF BREAST: ICD-10-CM

## 2023-10-11 DIAGNOSIS — J30.9 SPASMODIC RHINORRHEA: Primary | ICD-10-CM

## 2023-10-11 DIAGNOSIS — D80.9 IMMUNODEFICIENCY WITH PREDOMINANTLY ANTIBODY DEFECTS, UNSPECIFIED: ICD-10-CM

## 2023-10-11 DIAGNOSIS — N83.202 CYST OF LEFT OVARY: Primary | ICD-10-CM

## 2023-10-11 LAB — IGG1 SER-MCNC: 1161 MG/DL (ref 700–1600)

## 2023-10-11 PROCEDURE — 36415 COLL VENOUS BLD VENIPUNCTURE: CPT

## 2023-10-11 PROCEDURE — 82784 ASSAY IGA/IGD/IGG/IGM EACH: CPT

## 2023-10-11 RX ORDER — DOXYCYCLINE 100 MG/1
CAPSULE ORAL
COMMUNITY
Start: 2023-10-10

## 2023-10-11 NOTE — PROGRESS NOTES
"Chief Complaint   Patient presents with    Follow-up     3 month GYN F/U, Pelvic U/S today, Pt has no complaints today, Doing well         HPI  Lilliana Robles is a 73 y.o. female is scheduled for f/u visit with u/s. She denies pelvic pain or vaginal bleeding.        The following portions of the patient's history were reviewed and updated as appropriate: allergies, current medications, past family history, past medical history, past social history, past surgical history, and problem list.    Review of Systems  Pertinent items are noted in HPI.    /86   Ht 157.5 cm (62\")   Wt 85.4 kg (188 lb 3.2 oz)   LMP  (LMP Unknown)   BMI 34.42 kg/mý         Physical Exam  Constitutional:       Appearance: Normal appearance.   Pulmonary:      Effort: Pulmonary effort is normal.   Neurological:      Mental Status: She is alert.   Psychiatric:         Mood and Affect: Mood normal.         Behavior: Behavior normal.       Tv u/s: uterus absent, normal right and left ovaries, no free fluid      Diagnoses and all orders for this visit:    1. Cyst of left ovary (Primary)    2. Encounter for screening mammogram for malignant neoplasm of breast  -     Mammo Screening Digital Tomosynthesis Bilateral With CAD; Future      Previously noted small cysts of ovary are no longer noted. Plan f/u as needed or for annual.           "

## 2024-02-08 ENCOUNTER — OFFICE (OUTPATIENT)
Dept: URBAN - METROPOLITAN AREA CLINIC 76 | Facility: CLINIC | Age: 74
End: 2024-02-08

## 2024-02-08 VITALS
DIASTOLIC BLOOD PRESSURE: 78 MMHG | HEIGHT: 62 IN | WEIGHT: 188 LBS | HEART RATE: 78 BPM | SYSTOLIC BLOOD PRESSURE: 100 MMHG | OXYGEN SATURATION: 98 %

## 2024-02-08 DIAGNOSIS — K21.9 GASTRO-ESOPHAGEAL REFLUX DISEASE WITHOUT ESOPHAGITIS: ICD-10-CM

## 2024-02-08 DIAGNOSIS — R19.4 CHANGE IN BOWEL HABIT: ICD-10-CM

## 2024-02-08 DIAGNOSIS — R13.10 DYSPHAGIA, UNSPECIFIED: ICD-10-CM

## 2024-02-08 DIAGNOSIS — Z80.9 FAMILY HISTORY OF MALIGNANT NEOPLASM, UNSPECIFIED: ICD-10-CM

## 2024-02-08 DIAGNOSIS — Z79.1 LONG TERM (CURRENT) USE OF NON-STEROIDAL ANTI-INFLAMMATORIES: ICD-10-CM

## 2024-02-08 DIAGNOSIS — K58.0 IRRITABLE BOWEL SYNDROME WITH DIARRHEA: ICD-10-CM

## 2024-02-08 DIAGNOSIS — D83.9 COMMON VARIABLE IMMUNODEFICIENCY, UNSPECIFIED: ICD-10-CM

## 2024-02-08 PROCEDURE — 99214 OFFICE O/P EST MOD 30 MIN: CPT | Performed by: NURSE PRACTITIONER

## 2024-02-08 RX ORDER — RIFAXIMIN 550 MG/1
TABLET ORAL
Qty: 42 | Refills: 2 | Status: COMPLETED
Start: 2024-02-08 | End: 2024-06-06

## 2024-06-06 ENCOUNTER — OFFICE (OUTPATIENT)
Dept: URBAN - METROPOLITAN AREA CLINIC 76 | Facility: CLINIC | Age: 74
End: 2024-06-06
Payer: MEDICARE

## 2024-06-06 VITALS
SYSTOLIC BLOOD PRESSURE: 120 MMHG | DIASTOLIC BLOOD PRESSURE: 78 MMHG | HEART RATE: 83 BPM | HEIGHT: 62 IN | WEIGHT: 187 LBS

## 2024-06-06 DIAGNOSIS — Z80.9 FAMILY HISTORY OF MALIGNANT NEOPLASM, UNSPECIFIED: ICD-10-CM

## 2024-06-06 DIAGNOSIS — D83.9 COMMON VARIABLE IMMUNODEFICIENCY, UNSPECIFIED: ICD-10-CM

## 2024-06-06 DIAGNOSIS — R19.4 CHANGE IN BOWEL HABIT: ICD-10-CM

## 2024-06-06 DIAGNOSIS — K58.0 IRRITABLE BOWEL SYNDROME WITH DIARRHEA: ICD-10-CM

## 2024-06-06 DIAGNOSIS — K21.9 GASTRO-ESOPHAGEAL REFLUX DISEASE WITHOUT ESOPHAGITIS: ICD-10-CM

## 2024-06-06 PROCEDURE — 99214 OFFICE O/P EST MOD 30 MIN: CPT | Performed by: NURSE PRACTITIONER

## 2024-07-01 ENCOUNTER — OFFICE (OUTPATIENT)
Age: 74
End: 2024-07-01

## 2024-07-01 ENCOUNTER — OFFICE (OUTPATIENT)
Dept: URBAN - METROPOLITAN AREA CLINIC 76 | Facility: CLINIC | Age: 74
End: 2024-07-01

## 2024-07-31 ENCOUNTER — OFFICE (OUTPATIENT)
Dept: URBAN - METROPOLITAN AREA CLINIC 76 | Facility: CLINIC | Age: 74
End: 2024-07-31
Payer: MEDICARE

## 2024-07-31 DIAGNOSIS — K58.0 IRRITABLE BOWEL SYNDROME WITH DIARRHEA: ICD-10-CM

## 2024-07-31 DIAGNOSIS — K21.9 GASTRO-ESOPHAGEAL REFLUX DISEASE WITHOUT ESOPHAGITIS: ICD-10-CM

## 2024-07-31 PROCEDURE — 99490 CHRNC CARE MGMT STAFF 1ST 20: CPT | Performed by: NURSE PRACTITIONER

## 2024-07-31 PROCEDURE — 99439 CHRNC CARE MGMT STAF EA ADDL: CPT | Performed by: NURSE PRACTITIONER

## 2024-08-05 ENCOUNTER — OFFICE VISIT (OUTPATIENT)
Dept: OBSTETRICS AND GYNECOLOGY | Age: 74
End: 2024-08-05
Payer: MEDICARE

## 2024-08-05 ENCOUNTER — HOSPITAL ENCOUNTER (OUTPATIENT)
Facility: HOSPITAL | Age: 74
Discharge: HOME | End: 2024-08-05
Admitting: OBSTETRICS & GYNECOLOGY
Payer: MEDICARE

## 2024-08-05 VITALS
DIASTOLIC BLOOD PRESSURE: 86 MMHG | WEIGHT: 188 LBS | HEIGHT: 62 IN | SYSTOLIC BLOOD PRESSURE: 132 MMHG | BODY MASS INDEX: 34.6 KG/M2

## 2024-08-05 DIAGNOSIS — Z12.31 ENCOUNTER FOR SCREENING MAMMOGRAM FOR MALIGNANT NEOPLASM OF BREAST: ICD-10-CM

## 2024-08-05 DIAGNOSIS — N39.41 URGE INCONTINENCE: ICD-10-CM

## 2024-08-05 DIAGNOSIS — Z12.31 SCREENING MAMMOGRAM FOR BREAST CANCER: Primary | ICD-10-CM

## 2024-08-05 DIAGNOSIS — Z01.419 ENCOUNTER FOR GYNECOLOGICAL EXAMINATION: Primary | ICD-10-CM

## 2024-08-05 PROCEDURE — 77063 BREAST TOMOSYNTHESIS BI: CPT

## 2024-08-05 PROCEDURE — 1160F RVW MEDS BY RX/DR IN RCRD: CPT | Performed by: OBSTETRICS & GYNECOLOGY

## 2024-08-05 PROCEDURE — G0101 CA SCREEN;PELVIC/BREAST EXAM: HCPCS | Performed by: OBSTETRICS & GYNECOLOGY

## 2024-08-05 PROCEDURE — 1159F MED LIST DOCD IN RCRD: CPT | Performed by: OBSTETRICS & GYNECOLOGY

## 2024-08-05 PROCEDURE — 77067 SCR MAMMO BI INCL CAD: CPT

## 2024-08-05 RX ORDER — ACETAMINOPHEN 160 MG
TABLET,DISINTEGRATING ORAL
COMMUNITY

## 2024-08-05 RX ORDER — TRAZODONE HYDROCHLORIDE 100 MG/1
100 TABLET ORAL DAILY
COMMUNITY
Start: 2024-03-14

## 2024-08-05 RX ORDER — PROMETHAZINE HYDROCHLORIDE 25 MG/1
25 TABLET ORAL
COMMUNITY
Start: 2024-07-24

## 2024-08-05 RX ORDER — LEVALBUTEROL INHALATION SOLUTION 0.63 MG/3ML
0.63 SOLUTION RESPIRATORY (INHALATION)
COMMUNITY
Start: 2024-03-18

## 2024-08-05 RX ORDER — FUROSEMIDE 40 MG/1
40 TABLET ORAL DAILY
COMMUNITY
Start: 2024-07-08

## 2024-08-05 RX ORDER — BACLOFEN 10 MG/1
10 TABLET ORAL
COMMUNITY
Start: 2024-04-05

## 2024-08-05 RX ORDER — RIVAROXABAN 20 MG/1
TABLET, FILM COATED ORAL
COMMUNITY

## 2024-08-05 RX ORDER — IPRATROPIUM BROMIDE 42 UG/1
SPRAY, METERED NASAL
COMMUNITY

## 2024-08-05 RX ORDER — METOPROLOL SUCCINATE 25 MG/1
25 TABLET, EXTENDED RELEASE ORAL DAILY
COMMUNITY
Start: 2024-07-24

## 2024-08-05 RX ORDER — DOXYCYCLINE HYCLATE 100 MG/1
CAPSULE ORAL
COMMUNITY
Start: 2024-05-21

## 2024-08-05 RX ORDER — BUDESONIDE, GLYCOPYRROLATE, AND FORMOTEROL FUMARATE 160; 9; 4.8 UG/1; UG/1; UG/1
AEROSOL, METERED RESPIRATORY (INHALATION)
COMMUNITY

## 2024-08-05 RX ORDER — VENLAFAXINE HYDROCHLORIDE 75 MG/1
75 CAPSULE, EXTENDED RELEASE ORAL DAILY
COMMUNITY
Start: 2024-03-14

## 2024-08-05 NOTE — PROGRESS NOTES
Subjective     Chief Complaint   Patient presents with    Gynecologic Exam     Annual exam, Last Pap unknown (HYST), Mammogram today, Last DEXA 2023, last Colonoscopy 2020, Pt has no complaints today, Doing well       History of Present Illness      Lilliana Robles is a 73 y.o.  who presents for annual exam.  She denies vaginal bleeding. She saw Dr. Wiggins and is being treated for interstitial cystitis. She notes some frequent UTI's as well. She denies pelvic pain otherwise.     She notes she has had worsening soa and her cardiologist is following her closely and is working with her pulmonologist.  She also notes IBS symptoms are worsening. Her GI MD may proceed with colonoscopy this year for further evaluation.     OB History          3    Para   2    Term   2            AB   1    Living   2         SAB   1    IAB        Ectopic        Molar        Multiple        Live Births   2               Menstrual History:     No LMP recorded (lmp unknown). Patient has had a hysterectomy.         Current contraception: status post hysterectomy for prolapse  History of abnormal Pap smear: no  Received Gardasil immunization: no  Perform regular self breast exam: no  Family history of uterine or ovarian cancer: yes - pat aunt -ovary  Family History of colon cancer: yes - pat uncle  Family history of breast cancer: no    Mammogram: done today.  Colonoscopy: managed by GI MD and scheduling  DEXA: up to date., mild osteopenia     Exercise: not active  Calcium/Vitamin D: adequate intake    The following portions of the patient's history were reviewed and updated as appropriate: allergies, current medications, past family history, past medical history, past social history, past surgical history, and problem list.    Review of Systems    Review of Systems   Constitutional: Positive for fatigue.   Respiratory: Positive for shortness of breath.    Gastrointestinal: Positive for abdominal pain.  "  Genitourinary: Positive for urinary urgency; Negative for vag bleeding or pelvic pain.   Neurological: Negative for headaches.   Psychiatric/Behavioral: Negative for depressed mood.         Objective   Physical Exam    /86   Ht 157.5 cm (62\")   Wt 85.3 kg (188 lb)   LMP  (LMP Unknown)   BMI 34.39 kg/m²   General:   Alert, in no distress   Heart: regular rate and rhythm   Lungs: clear to auscultation bilaterally   Breast: Inspection with healed scar left upper outer quadrant from prior excision; otherwise negative. Left breast is without masses, retractions, nipple discharge or axillary adenopathy. Right breast is without masses, retractions, nipple discharge or axillary adenopathy.     Neck: supple   Abdomen: Soft, no tenderness or guarding   Pelvis: External genitalia: normal general appearance  Urinary system: urethral meatus normal  Vaginal: atrophic mucosa without focal lesions  Cervix: removed surgically, cuff without lesions  Adnexa: no masses or tenderness  Uterus: removed surgically   Extremities: Normal without edema   Neurologic: Alert and oriented   Psychiatric: Normal affect, judgment and mood     Assessment & Plan   Diagnoses and all orders for this visit:    1. Encounter for gynecological examination (Primary)    2. Urge incontinence        All questions answered.  Breast self exam technique reviewed and patient encouraged to perform self-exam monthly.  Discussed healthy lifestyle modifications.  Recommended 30 minutes of aerobic exercise five times per week.  Discussed calcium / Vit d needs to prevent osteoporosis.  Pap def as not indicated, advised pt to call if she does not receive results of mammogram within 2 weeks.   She will f/u with Dr. Wiggins for management of interstitial cystitis.     Discussed genetic testing due to family hx of cancer and pt declines          "

## 2024-08-08 ENCOUNTER — OFFICE (OUTPATIENT)
Dept: URBAN - METROPOLITAN AREA CLINIC 76 | Facility: CLINIC | Age: 74
End: 2024-08-08
Payer: MEDICARE

## 2024-08-08 VITALS
SYSTOLIC BLOOD PRESSURE: 119 MMHG | DIASTOLIC BLOOD PRESSURE: 69 MMHG | HEIGHT: 62 IN | WEIGHT: 189 LBS | HEART RATE: 87 BPM

## 2024-08-08 DIAGNOSIS — D83.9 COMMON VARIABLE IMMUNODEFICIENCY, UNSPECIFIED: ICD-10-CM

## 2024-08-08 DIAGNOSIS — K58.0 IRRITABLE BOWEL SYNDROME WITH DIARRHEA: ICD-10-CM

## 2024-08-08 DIAGNOSIS — R13.10 DYSPHAGIA, UNSPECIFIED: ICD-10-CM

## 2024-08-08 DIAGNOSIS — Z79.1 LONG TERM (CURRENT) USE OF NON-STEROIDAL ANTI-INFLAMMATORIES: ICD-10-CM

## 2024-08-08 DIAGNOSIS — R19.4 CHANGE IN BOWEL HABIT: ICD-10-CM

## 2024-08-08 DIAGNOSIS — R10.9 UNSPECIFIED ABDOMINAL PAIN: ICD-10-CM

## 2024-08-08 DIAGNOSIS — K21.9 GASTRO-ESOPHAGEAL REFLUX DISEASE WITHOUT ESOPHAGITIS: ICD-10-CM

## 2024-08-08 PROCEDURE — 99214 OFFICE O/P EST MOD 30 MIN: CPT | Performed by: NURSE PRACTITIONER

## 2024-08-08 RX ORDER — VONOPRAZAN FUMARATE 13.36 MG/1
TABLET ORAL
Qty: 30 | Refills: 11 | Status: ACTIVE
Start: 2024-08-08

## 2024-08-31 ENCOUNTER — OFFICE (OUTPATIENT)
Dept: URBAN - METROPOLITAN AREA CLINIC 76 | Facility: CLINIC | Age: 74
End: 2024-08-31
Payer: MEDICARE

## 2024-08-31 ENCOUNTER — OFFICE (OUTPATIENT)
Age: 74
End: 2024-08-31
Payer: MEDICARE

## 2024-08-31 DIAGNOSIS — K21.9 GASTRO-ESOPHAGEAL REFLUX DISEASE WITHOUT ESOPHAGITIS: ICD-10-CM

## 2024-08-31 DIAGNOSIS — K58.0 IRRITABLE BOWEL SYNDROME WITH DIARRHEA: ICD-10-CM

## 2024-08-31 PROCEDURE — 99490 CHRNC CARE MGMT STAFF 1ST 20: CPT | Performed by: NURSE PRACTITIONER

## 2024-09-30 ENCOUNTER — OFFICE (OUTPATIENT)
Age: 74
End: 2024-09-30
Payer: MEDICARE

## 2024-09-30 ENCOUNTER — OFFICE (OUTPATIENT)
Dept: URBAN - METROPOLITAN AREA CLINIC 76 | Facility: CLINIC | Age: 74
End: 2024-09-30
Payer: MEDICARE

## 2024-09-30 DIAGNOSIS — K21.9 GASTRO-ESOPHAGEAL REFLUX DISEASE WITHOUT ESOPHAGITIS: ICD-10-CM

## 2024-09-30 DIAGNOSIS — K58.0 IRRITABLE BOWEL SYNDROME WITH DIARRHEA: ICD-10-CM

## 2024-09-30 PROCEDURE — 99439 CHRNC CARE MGMT STAF EA ADDL: CPT | Performed by: NURSE PRACTITIONER

## 2024-09-30 PROCEDURE — 99490 CHRNC CARE MGMT STAFF 1ST 20: CPT | Performed by: NURSE PRACTITIONER

## 2024-10-07 ENCOUNTER — ON CAMPUS - OUTPATIENT (OUTPATIENT)
Age: 74
End: 2024-10-07
Payer: MEDICARE

## 2024-10-07 ENCOUNTER — ON CAMPUS - OUTPATIENT (OUTPATIENT)
Dept: URBAN - METROPOLITAN AREA HOSPITAL 108 | Facility: HOSPITAL | Age: 74
End: 2024-10-07
Payer: MEDICARE

## 2024-10-07 DIAGNOSIS — K29.50 UNSPECIFIED CHRONIC GASTRITIS WITHOUT BLEEDING: ICD-10-CM

## 2024-10-07 DIAGNOSIS — R19.7 DIARRHEA, UNSPECIFIED: ICD-10-CM

## 2024-10-07 DIAGNOSIS — R13.10 DYSPHAGIA, UNSPECIFIED: ICD-10-CM

## 2024-10-07 PROCEDURE — 43450 DILATE ESOPHAGUS 1/MULT PASS: CPT | Performed by: INTERNAL MEDICINE

## 2024-10-07 PROCEDURE — 43239 EGD BIOPSY SINGLE/MULTIPLE: CPT | Mod: 51 | Performed by: INTERNAL MEDICINE

## 2024-10-07 PROCEDURE — 45380 COLONOSCOPY AND BIOPSY: CPT | Performed by: INTERNAL MEDICINE

## 2024-10-31 ENCOUNTER — OFFICE (OUTPATIENT)
Age: 74
End: 2024-10-31
Payer: MEDICARE

## 2024-10-31 ENCOUNTER — OFFICE (OUTPATIENT)
Dept: URBAN - METROPOLITAN AREA CLINIC 76 | Facility: CLINIC | Age: 74
End: 2024-10-31
Payer: MEDICARE

## 2024-10-31 DIAGNOSIS — K21.9 GASTRO-ESOPHAGEAL REFLUX DISEASE WITHOUT ESOPHAGITIS: ICD-10-CM

## 2024-10-31 DIAGNOSIS — K58.0 IRRITABLE BOWEL SYNDROME WITH DIARRHEA: ICD-10-CM

## 2024-10-31 PROCEDURE — 99439 CHRNC CARE MGMT STAF EA ADDL: CPT | Performed by: NURSE PRACTITIONER

## 2024-10-31 PROCEDURE — 99490 CHRNC CARE MGMT STAFF 1ST 20: CPT | Performed by: NURSE PRACTITIONER

## 2024-11-21 ENCOUNTER — OFFICE (OUTPATIENT)
Age: 74
End: 2024-11-21
Payer: MEDICARE

## 2024-11-21 ENCOUNTER — OFFICE (OUTPATIENT)
Dept: URBAN - METROPOLITAN AREA CLINIC 76 | Facility: CLINIC | Age: 74
End: 2024-11-21
Payer: MEDICARE

## 2024-11-21 VITALS
WEIGHT: 162 LBS | WEIGHT: 162 LBS | OXYGEN SATURATION: 95 % | WEIGHT: 162 LBS | DIASTOLIC BLOOD PRESSURE: 80 MMHG | DIASTOLIC BLOOD PRESSURE: 80 MMHG | HEIGHT: 62 IN | OXYGEN SATURATION: 95 % | WEIGHT: 162 LBS | SYSTOLIC BLOOD PRESSURE: 126 MMHG | HEIGHT: 62 IN | WEIGHT: 162 LBS | DIASTOLIC BLOOD PRESSURE: 80 MMHG | OXYGEN SATURATION: 95 % | HEART RATE: 80 BPM | SYSTOLIC BLOOD PRESSURE: 126 MMHG | OXYGEN SATURATION: 95 % | SYSTOLIC BLOOD PRESSURE: 126 MMHG | HEART RATE: 80 BPM | OXYGEN SATURATION: 95 % | WEIGHT: 162 LBS | HEIGHT: 62 IN | HEART RATE: 80 BPM | HEIGHT: 62 IN | OXYGEN SATURATION: 95 % | DIASTOLIC BLOOD PRESSURE: 80 MMHG | DIASTOLIC BLOOD PRESSURE: 80 MMHG | HEIGHT: 62 IN | HEART RATE: 80 BPM | SYSTOLIC BLOOD PRESSURE: 126 MMHG | WEIGHT: 162 LBS | DIASTOLIC BLOOD PRESSURE: 80 MMHG | SYSTOLIC BLOOD PRESSURE: 126 MMHG | HEIGHT: 62 IN | HEIGHT: 62 IN | SYSTOLIC BLOOD PRESSURE: 126 MMHG | HEART RATE: 80 BPM | DIASTOLIC BLOOD PRESSURE: 80 MMHG | SYSTOLIC BLOOD PRESSURE: 126 MMHG | HEART RATE: 80 BPM | HEART RATE: 80 BPM | OXYGEN SATURATION: 95 %

## 2024-11-21 DIAGNOSIS — K21.9 GASTRO-ESOPHAGEAL REFLUX DISEASE WITHOUT ESOPHAGITIS: ICD-10-CM

## 2024-11-21 DIAGNOSIS — K58.0 IRRITABLE BOWEL SYNDROME WITH DIARRHEA: ICD-10-CM

## 2024-11-21 DIAGNOSIS — R10.9 UNSPECIFIED ABDOMINAL PAIN: ICD-10-CM

## 2024-11-21 DIAGNOSIS — D83.9 COMMON VARIABLE IMMUNODEFICIENCY, UNSPECIFIED: ICD-10-CM

## 2024-11-21 DIAGNOSIS — R13.10 DYSPHAGIA, UNSPECIFIED: ICD-10-CM

## 2024-11-21 DIAGNOSIS — Z80.9 FAMILY HISTORY OF MALIGNANT NEOPLASM, UNSPECIFIED: ICD-10-CM

## 2024-11-21 PROCEDURE — 99214 OFFICE O/P EST MOD 30 MIN: CPT | Performed by: NURSE PRACTITIONER

## 2024-11-21 RX ORDER — VONOPRAZAN FUMARATE 13.36 MG/1
TABLET ORAL
Qty: 30 | Refills: 11 | Status: ACTIVE
Start: 2024-08-08

## 2024-11-30 ENCOUNTER — OFFICE (OUTPATIENT)
Dept: URBAN - METROPOLITAN AREA CLINIC 76 | Facility: CLINIC | Age: 74
End: 2024-11-30
Payer: MEDICARE

## 2024-11-30 DIAGNOSIS — K21.9 GASTRO-ESOPHAGEAL REFLUX DISEASE WITHOUT ESOPHAGITIS: ICD-10-CM

## 2024-11-30 DIAGNOSIS — K58.0 IRRITABLE BOWEL SYNDROME WITH DIARRHEA: ICD-10-CM

## 2024-11-30 PROCEDURE — 99490 CHRNC CARE MGMT STAFF 1ST 20: CPT | Performed by: NURSE PRACTITIONER

## 2025-01-31 ENCOUNTER — OFFICE (OUTPATIENT)
Dept: URBAN - METROPOLITAN AREA CLINIC 76 | Facility: CLINIC | Age: 75
End: 2025-01-31
Payer: MEDICARE

## 2025-01-31 DIAGNOSIS — K21.9 GASTRO-ESOPHAGEAL REFLUX DISEASE WITHOUT ESOPHAGITIS: ICD-10-CM

## 2025-01-31 DIAGNOSIS — K58.0 IRRITABLE BOWEL SYNDROME WITH DIARRHEA: ICD-10-CM

## 2025-01-31 PROCEDURE — 99439 CHRNC CARE MGMT STAF EA ADDL: CPT

## 2025-01-31 PROCEDURE — 99490 CHRNC CARE MGMT STAFF 1ST 20: CPT

## 2025-02-28 ENCOUNTER — OFFICE (OUTPATIENT)
Dept: URBAN - METROPOLITAN AREA CLINIC 76 | Facility: CLINIC | Age: 75
End: 2025-02-28
Payer: MEDICARE

## 2025-02-28 DIAGNOSIS — K21.9 GASTRO-ESOPHAGEAL REFLUX DISEASE WITHOUT ESOPHAGITIS: ICD-10-CM

## 2025-02-28 DIAGNOSIS — K58.0 IRRITABLE BOWEL SYNDROME WITH DIARRHEA: ICD-10-CM

## 2025-02-28 PROCEDURE — 99439 CHRNC CARE MGMT STAF EA ADDL: CPT

## 2025-02-28 PROCEDURE — 99490 CHRNC CARE MGMT STAFF 1ST 20: CPT

## 2025-03-31 ENCOUNTER — OFFICE (OUTPATIENT)
Dept: URBAN - METROPOLITAN AREA CLINIC 76 | Facility: CLINIC | Age: 75
End: 2025-03-31
Payer: MEDICARE

## 2025-03-31 DIAGNOSIS — K58.0 IRRITABLE BOWEL SYNDROME WITH DIARRHEA: ICD-10-CM

## 2025-03-31 DIAGNOSIS — K21.9 GASTRO-ESOPHAGEAL REFLUX DISEASE WITHOUT ESOPHAGITIS: ICD-10-CM

## 2025-03-31 PROCEDURE — 99490 CHRNC CARE MGMT STAFF 1ST 20: CPT

## 2025-04-30 ENCOUNTER — OFFICE (OUTPATIENT)
Dept: URBAN - METROPOLITAN AREA CLINIC 76 | Facility: CLINIC | Age: 75
End: 2025-04-30
Payer: MEDICARE

## 2025-04-30 DIAGNOSIS — K21.9 GASTRO-ESOPHAGEAL REFLUX DISEASE WITHOUT ESOPHAGITIS: ICD-10-CM

## 2025-04-30 DIAGNOSIS — K58.0 IRRITABLE BOWEL SYNDROME WITH DIARRHEA: ICD-10-CM

## 2025-04-30 PROCEDURE — 99490 CHRNC CARE MGMT STAFF 1ST 20: CPT

## 2025-08-12 ENCOUNTER — HOSPITAL ENCOUNTER (OUTPATIENT)
Facility: HOSPITAL | Age: 75
Discharge: HOME | End: 2025-08-12
Admitting: OBSTETRICS & GYNECOLOGY
Payer: MEDICARE

## 2025-08-12 ENCOUNTER — OFFICE VISIT (OUTPATIENT)
Dept: OBSTETRICS AND GYNECOLOGY | Age: 75
End: 2025-08-12
Payer: MEDICARE

## 2025-08-12 VITALS
SYSTOLIC BLOOD PRESSURE: 122 MMHG | HEIGHT: 62 IN | WEIGHT: 194.2 LBS | BODY MASS INDEX: 35.74 KG/M2 | DIASTOLIC BLOOD PRESSURE: 72 MMHG

## 2025-08-12 DIAGNOSIS — M85.80 OSTEOPENIA AFTER MENOPAUSE: ICD-10-CM

## 2025-08-12 DIAGNOSIS — Z01.419 ENCOUNTER FOR GYNECOLOGICAL EXAMINATION: Primary | ICD-10-CM

## 2025-08-12 DIAGNOSIS — Z12.31 ENCOUNTER FOR SCREENING MAMMOGRAM FOR MALIGNANT NEOPLASM OF BREAST: ICD-10-CM

## 2025-08-12 DIAGNOSIS — Z78.0 OSTEOPENIA AFTER MENOPAUSE: ICD-10-CM

## 2025-08-12 DIAGNOSIS — Z12.31 SCREENING MAMMOGRAM FOR BREAST CANCER: ICD-10-CM

## 2025-08-12 PROBLEM — Z80.9 FAMILY HISTORY OF CANCER: Status: ACTIVE | Noted: 2025-08-12

## 2025-08-12 PROCEDURE — 77067 SCR MAMMO BI INCL CAD: CPT

## 2025-08-12 PROCEDURE — 77063 BREAST TOMOSYNTHESIS BI: CPT
